# Patient Record
Sex: FEMALE | Race: WHITE | NOT HISPANIC OR LATINO | URBAN - METROPOLITAN AREA
[De-identification: names, ages, dates, MRNs, and addresses within clinical notes are randomized per-mention and may not be internally consistent; named-entity substitution may affect disease eponyms.]

---

## 2017-02-02 ENCOUNTER — EMERGENCY (EMERGENCY)
Facility: HOSPITAL | Age: 37
LOS: 1 days | Discharge: HOME | End: 2017-02-02
Attending: EMERGENCY MEDICINE | Admitting: EMERGENCY MEDICINE
Payer: COMMERCIAL

## 2017-02-02 VITALS
DIASTOLIC BLOOD PRESSURE: 77 MMHG | SYSTOLIC BLOOD PRESSURE: 115 MMHG | TEMPERATURE: 97 F | OXYGEN SATURATION: 100 % | HEART RATE: 891 BPM | RESPIRATION RATE: 18 BRPM

## 2017-02-02 DIAGNOSIS — R00.2 PALPITATIONS: ICD-10-CM

## 2017-02-02 DIAGNOSIS — R06.02 SHORTNESS OF BREATH: ICD-10-CM

## 2017-02-02 DIAGNOSIS — R61 GENERALIZED HYPERHIDROSIS: ICD-10-CM

## 2017-02-02 DIAGNOSIS — R51 HEADACHE: ICD-10-CM

## 2017-02-02 DIAGNOSIS — R55 SYNCOPE AND COLLAPSE: ICD-10-CM

## 2017-02-02 DIAGNOSIS — R10.13 EPIGASTRIC PAIN: ICD-10-CM

## 2017-02-02 PROCEDURE — 93010 ELECTROCARDIOGRAM REPORT: CPT

## 2017-02-02 PROCEDURE — 99285 EMERGENCY DEPT VISIT HI MDM: CPT | Mod: 25

## 2017-02-02 NOTE — ED ADULT NURSE NOTE - CHPI ED SYMPTOMS NEG
no cough/no back pain/no fever/no shortness of breath/no syncope/no chills/no diaphoresis/no nausea/no vomiting

## 2017-02-02 NOTE — ED ADULT NURSE NOTE - OBJECTIVE STATEMENT
pt c/o "constant epigastric pain that worsens after eating unresolved with zantac. around 2115 tonight had an episode of SOB after going up 1 flight of stairs and could not speak in full sentences." SOB has resolved and breathing fine at present

## 2017-02-03 VITALS
RESPIRATION RATE: 16 BRPM | HEART RATE: 75 BPM | OXYGEN SATURATION: 100 % | DIASTOLIC BLOOD PRESSURE: 75 MMHG | SYSTOLIC BLOOD PRESSURE: 105 MMHG

## 2017-02-03 LAB
ALBUMIN SERPL ELPH-MCNC: 4.3 G/DL — SIGNIFICANT CHANGE UP (ref 3.3–5)
ALP SERPL-CCNC: 33 U/L — LOW (ref 40–120)
ALT FLD-CCNC: 14 U/L RC — SIGNIFICANT CHANGE UP (ref 10–45)
ANION GAP SERPL CALC-SCNC: 10 MMOL/L — SIGNIFICANT CHANGE UP (ref 5–17)
AST SERPL-CCNC: 17 U/L — SIGNIFICANT CHANGE UP (ref 10–40)
BASOPHILS # BLD AUTO: 0 K/UL — SIGNIFICANT CHANGE UP (ref 0–0.2)
BASOPHILS NFR BLD AUTO: 0.7 % — SIGNIFICANT CHANGE UP (ref 0–2)
BILIRUB SERPL-MCNC: 0.3 MG/DL — SIGNIFICANT CHANGE UP (ref 0.2–1.2)
BUN SERPL-MCNC: 13 MG/DL — SIGNIFICANT CHANGE UP (ref 7–23)
CALCIUM SERPL-MCNC: 9 MG/DL — SIGNIFICANT CHANGE UP (ref 8.4–10.5)
CHLORIDE SERPL-SCNC: 106 MMOL/L — SIGNIFICANT CHANGE UP (ref 96–108)
CO2 SERPL-SCNC: 24 MMOL/L — SIGNIFICANT CHANGE UP (ref 22–31)
CREAT SERPL-MCNC: 0.69 MG/DL — SIGNIFICANT CHANGE UP (ref 0.5–1.3)
D DIMER BLD IA.RAPID-MCNC: <150 NG/ML DDU — SIGNIFICANT CHANGE UP
EOSINOPHIL # BLD AUTO: 0.1 K/UL — SIGNIFICANT CHANGE UP (ref 0–0.5)
EOSINOPHIL NFR BLD AUTO: 1 % — SIGNIFICANT CHANGE UP (ref 0–6)
GAS PNL BLDV: SIGNIFICANT CHANGE UP
GLUCOSE SERPL-MCNC: 116 MG/DL — HIGH (ref 70–99)
HCT VFR BLD CALC: 35.1 % — SIGNIFICANT CHANGE UP (ref 34.5–45)
HGB BLD-MCNC: 12.1 G/DL — SIGNIFICANT CHANGE UP (ref 11.5–15.5)
LIDOCAIN IGE QN: 34 U/L — SIGNIFICANT CHANGE UP (ref 7–60)
LYMPHOCYTES # BLD AUTO: 2.9 K/UL — SIGNIFICANT CHANGE UP (ref 1–3.3)
LYMPHOCYTES # BLD AUTO: 45.4 % — HIGH (ref 13–44)
MCHC RBC-ENTMCNC: 31.9 PG — SIGNIFICANT CHANGE UP (ref 27–34)
MCHC RBC-ENTMCNC: 34.5 GM/DL — SIGNIFICANT CHANGE UP (ref 32–36)
MCV RBC AUTO: 92.4 FL — SIGNIFICANT CHANGE UP (ref 80–100)
MONOCYTES # BLD AUTO: 0.8 K/UL — SIGNIFICANT CHANGE UP (ref 0–0.9)
MONOCYTES NFR BLD AUTO: 12.2 % — SIGNIFICANT CHANGE UP (ref 2–14)
NEUTROPHILS # BLD AUTO: 2.6 K/UL — SIGNIFICANT CHANGE UP (ref 1.8–7.4)
NEUTROPHILS NFR BLD AUTO: 40.7 % — LOW (ref 43–77)
PLATELET # BLD AUTO: 196 K/UL — SIGNIFICANT CHANGE UP (ref 150–400)
POTASSIUM SERPL-MCNC: 4.2 MMOL/L — SIGNIFICANT CHANGE UP (ref 3.5–5.3)
POTASSIUM SERPL-SCNC: 4.2 MMOL/L — SIGNIFICANT CHANGE UP (ref 3.5–5.3)
PROT SERPL-MCNC: 6.9 G/DL — SIGNIFICANT CHANGE UP (ref 6–8.3)
RBC # BLD: 3.8 M/UL — SIGNIFICANT CHANGE UP (ref 3.8–5.2)
RBC # FLD: 11.9 % — SIGNIFICANT CHANGE UP (ref 10.3–14.5)
SODIUM SERPL-SCNC: 140 MMOL/L — SIGNIFICANT CHANGE UP (ref 135–145)
TROPONIN T SERPL-MCNC: <0.01 NG/ML — SIGNIFICANT CHANGE UP (ref 0–0.06)
WBC # BLD: 6.5 K/UL — SIGNIFICANT CHANGE UP (ref 3.8–10.5)
WBC # FLD AUTO: 6.5 K/UL — SIGNIFICANT CHANGE UP (ref 3.8–10.5)

## 2017-02-03 PROCEDURE — 84132 ASSAY OF SERUM POTASSIUM: CPT

## 2017-02-03 PROCEDURE — 99284 EMERGENCY DEPT VISIT MOD MDM: CPT | Mod: 25

## 2017-02-03 PROCEDURE — 85027 COMPLETE CBC AUTOMATED: CPT

## 2017-02-03 PROCEDURE — 82330 ASSAY OF CALCIUM: CPT

## 2017-02-03 PROCEDURE — 96374 THER/PROPH/DIAG INJ IV PUSH: CPT

## 2017-02-03 PROCEDURE — 85379 FIBRIN DEGRADATION QUANT: CPT

## 2017-02-03 PROCEDURE — 83690 ASSAY OF LIPASE: CPT

## 2017-02-03 PROCEDURE — 84295 ASSAY OF SERUM SODIUM: CPT

## 2017-02-03 PROCEDURE — 82435 ASSAY OF BLOOD CHLORIDE: CPT

## 2017-02-03 PROCEDURE — 93005 ELECTROCARDIOGRAM TRACING: CPT

## 2017-02-03 PROCEDURE — 96375 TX/PRO/DX INJ NEW DRUG ADDON: CPT

## 2017-02-03 PROCEDURE — 85014 HEMATOCRIT: CPT

## 2017-02-03 PROCEDURE — 82947 ASSAY GLUCOSE BLOOD QUANT: CPT

## 2017-02-03 PROCEDURE — 80053 COMPREHEN METABOLIC PANEL: CPT

## 2017-02-03 PROCEDURE — 82803 BLOOD GASES ANY COMBINATION: CPT

## 2017-02-03 PROCEDURE — 83605 ASSAY OF LACTIC ACID: CPT

## 2017-02-03 PROCEDURE — 71020: CPT | Mod: 26

## 2017-02-03 PROCEDURE — 71046 X-RAY EXAM CHEST 2 VIEWS: CPT

## 2017-02-03 PROCEDURE — 84484 ASSAY OF TROPONIN QUANT: CPT

## 2017-02-03 RX ORDER — OMEPRAZOLE 10 MG/1
1 CAPSULE, DELAYED RELEASE ORAL
Qty: 30 | Refills: 0 | OUTPATIENT
Start: 2017-02-03 | End: 2017-03-05

## 2017-02-03 RX ORDER — FAMOTIDINE 10 MG/ML
20 INJECTION INTRAVENOUS ONCE
Qty: 0 | Refills: 0 | Status: COMPLETED | OUTPATIENT
Start: 2017-02-03 | End: 2017-02-03

## 2017-02-03 RX ORDER — LIDOCAINE 4 G/100G
20 CREAM TOPICAL ONCE
Qty: 0 | Refills: 0 | Status: COMPLETED | OUTPATIENT
Start: 2017-02-03 | End: 2017-02-03

## 2017-02-03 RX ORDER — ACETAMINOPHEN 500 MG
1000 TABLET ORAL ONCE
Qty: 0 | Refills: 0 | Status: COMPLETED | OUTPATIENT
Start: 2017-02-03 | End: 2017-02-03

## 2017-02-03 RX ORDER — SODIUM CHLORIDE 9 MG/ML
1000 INJECTION INTRAMUSCULAR; INTRAVENOUS; SUBCUTANEOUS ONCE
Qty: 0 | Refills: 0 | Status: COMPLETED | OUTPATIENT
Start: 2017-02-03 | End: 2017-02-03

## 2017-02-03 RX ORDER — SODIUM CHLORIDE 9 MG/ML
3 INJECTION INTRAMUSCULAR; INTRAVENOUS; SUBCUTANEOUS ONCE
Qty: 0 | Refills: 0 | Status: COMPLETED | OUTPATIENT
Start: 2017-02-03 | End: 2017-02-03

## 2017-02-03 RX ADMIN — FAMOTIDINE 20 MILLIGRAM(S): 10 INJECTION INTRAVENOUS at 01:06

## 2017-02-03 RX ADMIN — Medication 30 MILLILITER(S): at 01:06

## 2017-02-03 RX ADMIN — LIDOCAINE 20 MILLILITER(S): 4 CREAM TOPICAL at 01:06

## 2017-02-03 RX ADMIN — Medication 1000 MILLIGRAM(S): at 01:34

## 2017-02-03 RX ADMIN — Medication 400 MILLIGRAM(S): at 01:06

## 2017-02-03 RX ADMIN — SODIUM CHLORIDE 2000 MILLILITER(S): 9 INJECTION INTRAMUSCULAR; INTRAVENOUS; SUBCUTANEOUS at 01:06

## 2017-02-03 RX ADMIN — SODIUM CHLORIDE 3 MILLILITER(S): 9 INJECTION INTRAMUSCULAR; INTRAVENOUS; SUBCUTANEOUS at 00:31

## 2017-02-03 NOTE — ED PROVIDER NOTE - PHYSICAL EXAMINATION
Shanti: A & O x 3, NAD, HEENT WNL and no facial asymmetry; lungs CTAB, heart with reg rhythm without murmur; abdomen soft, extremities with no edema; skin with no rashes, neuro exam non focal with no motor or sensory deficits

## 2017-02-03 NOTE — ED PROVIDER NOTE - PLAN OF CARE
take omeprazole (nexium) 20mg daily  use zantac as needed as indicated on the label with respect to the warnings  Follow up with your primary care doctor within 48-72 hours.   You must return for new, worsening or concerning symptoms; specifically including those listed on the attached sheet.

## 2017-02-03 NOTE — ED PROVIDER NOTE - MEDICAL DECISION MAKING DETAILS
Dr. Russo (Attending Physician)  Pt. with episode of epigastric/chest pain, shortness of breath followed by near syncope.  ECG nsr without ST changes.  Lungs clear.  heart wnl.  Will check cxr, basic labs, and d-dimer to eval for PE since patient. is on contraceptives.  Likely reflux will give gi cocktail and dc home with PPI.

## 2017-02-03 NOTE — ED PROVIDER NOTE - CARE PLAN
Principal Discharge DX:	Epigastric pain  Instructions for follow-up, activity and diet:	take omeprazole (nexium) 20mg daily  use zantac as needed as indicated on the label with respect to the warnings  Follow up with your primary care doctor within 48-72 hours.   You must return for new, worsening or concerning symptoms; specifically including those listed on the attached sheet.

## 2017-02-03 NOTE — ED PROVIDER NOTE - ATTENDING CONTRIBUTION TO CARE
Dr. Russo (Attending Physician)  I performed a history and physical exam of the patient and discussed their management with the resident. I reviewed the resident's note and agree with the documented findings and plan of care. My medical decision making and observations are found above.

## 2017-02-03 NOTE — ED PROVIDER NOTE - OBJECTIVE STATEMENT
36 year old, history of epilepsy (last seizure years ago), compliant with keppra with epigastric pain for 1 day (refractory to zantac), pleuritic, worse with eating. new feeling of severe shortness of breath, diaphoresis, palpitations, weakness in hand, headache, near syncope while walking. BIBA.     currently feels tired, head heaviness, epigastric tenderness    ROS: subjective fever, no eye pain, throat scratching,  chest pain,  shortness of breath, epigstric abdominal pain,  no dysuria, no muscle pain, no rashes, currently no focal neurologic complaints, no known mental health issues     FH: cardiac disease in aunt, father  PMD: pankaj ontiveros 36 year old, history of epilepsy (last seizure years ago), compliant with keppra with epigastric pain for 1 day (refractory to zantac), pleuritic, worse with eating. new feeling of severe shortness of breath, diaphoresis, palpitations, weakness in hand, headache, near syncope while walking. BIBA.     ocp, smoker, no personal history of blood clot, no recent travel or surgery  currently feels tired, head heaviness, epigastric tenderness    ROS: subjective fever, no eye pain, throat scratching,  chest pain,  shortness of breath, epigstric abdominal pain,  no dysuria, no muscle pain, no rashes, currently no focal neurologic complaints, no known mental health issues     FH: cardiac disease in aunt, father  PMD: pankaj ontiveros 36 year old, history of epilepsy (last seizure years ago), compliant with keppra with epigastric pain for 1 day (refractory to zantac), pleuritic, worse with eating. new feeling of severe shortness of breath, diaphoresis, palpitations, weakness in hand, headache, near syncope while walking. BIBA.     ocp, smoker, no pulmonary embolismrsonal history of blood clot, no recent travel or surgery  currently feels tired, head heaviness, epigastric tenderness    ROS: subjective fever, no eye pain, throat scratching,  chest pain,  shortness of breath, epigstric abdominal pain,  no dysuria, no muscle pain, no rashes, currently no focal neurologic complaints, no known mental health issues     FH: cardiac disease in aunt, father  PMD: pankaj ontiveros

## 2017-07-14 ENCOUNTER — EMERGENCY (EMERGENCY)
Facility: HOSPITAL | Age: 37
LOS: 1 days | Discharge: HOME | End: 2017-07-14
Attending: EMERGENCY MEDICINE | Admitting: EMERGENCY MEDICINE
Payer: COMMERCIAL

## 2017-07-14 VITALS
TEMPERATURE: 98 F | HEART RATE: 70 BPM | DIASTOLIC BLOOD PRESSURE: 73 MMHG | OXYGEN SATURATION: 99 % | SYSTOLIC BLOOD PRESSURE: 115 MMHG | RESPIRATION RATE: 16 BRPM

## 2017-07-14 VITALS
RESPIRATION RATE: 20 BRPM | OXYGEN SATURATION: 100 % | HEART RATE: 80 BPM | DIASTOLIC BLOOD PRESSURE: 114 MMHG | TEMPERATURE: 99 F

## 2017-07-14 PROCEDURE — 99284 EMERGENCY DEPT VISIT MOD MDM: CPT | Mod: 25

## 2017-07-14 RX ORDER — KETOROLAC TROMETHAMINE 30 MG/ML
30 SYRINGE (ML) INJECTION ONCE
Qty: 0 | Refills: 0 | Status: DISCONTINUED | OUTPATIENT
Start: 2017-07-14 | End: 2017-07-15

## 2017-07-14 RX ORDER — LEVETIRACETAM 250 MG/1
500 TABLET, FILM COATED ORAL ONCE
Qty: 0 | Refills: 0 | Status: COMPLETED | OUTPATIENT
Start: 2017-07-14 | End: 2017-07-14

## 2017-07-14 RX ORDER — CYCLOBENZAPRINE HYDROCHLORIDE 10 MG/1
0 TABLET, FILM COATED ORAL
Qty: 0 | Refills: 0 | COMMUNITY

## 2017-07-14 RX ORDER — OXYCODONE HYDROCHLORIDE 5 MG/1
5 TABLET ORAL ONCE
Qty: 0 | Refills: 0 | Status: DISCONTINUED | OUTPATIENT
Start: 2017-07-14 | End: 2017-07-14

## 2017-07-14 RX ORDER — DIAZEPAM 5 MG
5 TABLET ORAL ONCE
Qty: 0 | Refills: 0 | Status: DISCONTINUED | OUTPATIENT
Start: 2017-07-14 | End: 2017-07-14

## 2017-07-14 RX ORDER — LEVETIRACETAM 250 MG/1
0 TABLET, FILM COATED ORAL
Qty: 0 | Refills: 0 | COMMUNITY

## 2017-07-14 NOTE — ED PROVIDER NOTE - ATTENDING CONTRIBUTION TO CARE
Attending MD Durán:  I personally have seen and examined this patient.  Resident note reviewed and agree on plan of care and except where noted.  See MDM for details.

## 2017-07-14 NOTE — ED ADULT NURSE NOTE - OBJECTIVE STATEMENT
36 y/o female A&Ox4 walked in with c/o lower left back pain. Pmhx seizures on Keppra daily. Pt states she has had increasing back pain since Saturday and today pain suddenly became sharp, constant, and "unbearable." VSS, denies chest pain/SOB. LS clear and equal bilat, ABD soft nontender, denies n/v/d and constipation. Skin intact. Peripheral pulses strong and normal baseline sensation present x4. Safety and comfort measures maintained. States LMP was 4 years ago.

## 2017-07-14 NOTE — ED PROVIDER NOTE - OBJECTIVE STATEMENT
37 year old, history of epilepsy (last seizure years ago), compliant with keppra, scoliosis presents with 1 week h/o LLBP.  Pain started insidiously, atraumatically, went to UC today where had UA, xray which were negative, d/w muscle strain and discharged on naproxen and cyclobenzaprine earlier today.  Came to this ED with persistent pain.  Pain is located L lateral lower back, w/w laying down and sitting, improved with standing and walking.  Denies fever, chills, weakness, loss of sensation, tingling, leg pain, saddle anesthesia, IVDA, urinary/bladder incontinence/retention, rash, dysuria, hematuria.

## 2017-07-14 NOTE — ED PROVIDER NOTE - CARE PLAN
Principal Discharge DX:	Back pain Principal Discharge DX:	Back pain  Instructions for follow-up, activity and diet:	Leeanna MD: Patient reassessed and reports resolution of back pain. Patient able to walk w/o difficulty and w/o weakness or sensory deficits. Ready for D/C with Percocet BID PO for 5 days and Valium 5 mg PO every day for 5 days.

## 2017-07-14 NOTE — ED PROVIDER NOTE - PHYSICAL EXAMINATION
***GEN - well appearing; NAD   ***HEAD - NC/AT  ***EYES/NOSE - PEERL, EOMI, mucous membranes moist, no discharge   ***THROAT: Oral cavity and pharynx normal. No inflammation, swelling, exudate, or lesions.    ***NECK: supple, non-tender no lymphadenopathy  ***PULMONARY - CTA b/l, symmetric breath sounds.   ***CARDIAC- s1s2, RRR, no murmur  ***ABDOMEN - +BS, ND, NT, soft, no guarding, no rebound, no organomegaly  ***BACK - no CVA tenderness, Normal  spine, no midline TTP, mild TTP L lateral lumbar back  ***EXTREMITIES - symmetric pulses, 2+ dp, capillary refill < 2 seconds, no clubbing, no cyanosis, no edema   ***SKIN - warm, dry, intact, no rash or bruising   ***NEUROLOGIC - a&o x3, CN 2-12 intact, sensation nl, motor 5/5 RUE/LUE/RLE/LLE gait nl, no saddle anesthesia

## 2017-07-14 NOTE — ED PROVIDER NOTE - PLAN OF CARE
Leeanna LEONARD: Patient reassessed and reports resolution of back pain. Patient able to walk w/o difficulty and w/o weakness or sensory deficits. Ready for D/C with Percocet BID PO for 5 days and Valium 5 mg PO every day for 5 days.

## 2017-07-15 LAB — HCG SERPL-ACNC: <2 MIU/ML — SIGNIFICANT CHANGE UP

## 2017-07-15 PROCEDURE — 99284 EMERGENCY DEPT VISIT MOD MDM: CPT

## 2017-07-15 PROCEDURE — 84702 CHORIONIC GONADOTROPIN TEST: CPT

## 2017-07-15 RX ORDER — OXYCODONE HYDROCHLORIDE 5 MG/1
1 TABLET ORAL
Qty: 10 | Refills: 0 | OUTPATIENT
Start: 2017-07-15 | End: 2017-07-20

## 2017-07-15 RX ORDER — OXYCODONE HYDROCHLORIDE 5 MG/1
1 TABLET ORAL
Qty: 9 | Refills: 0 | OUTPATIENT
Start: 2017-07-15 | End: 2017-07-18

## 2017-07-15 RX ORDER — DIAZEPAM 5 MG
1 TABLET ORAL
Qty: 5 | Refills: 0 | OUTPATIENT
Start: 2017-07-15 | End: 2017-07-20

## 2017-07-15 RX ORDER — IBUPROFEN 200 MG
600 TABLET ORAL ONCE
Qty: 0 | Refills: 0 | Status: COMPLETED | OUTPATIENT
Start: 2017-07-15 | End: 2017-07-15

## 2017-07-15 RX ADMIN — Medication 600 MILLIGRAM(S): at 01:01

## 2017-07-15 RX ADMIN — OXYCODONE HYDROCHLORIDE 5 MILLIGRAM(S): 5 TABLET ORAL at 01:01

## 2017-07-15 RX ADMIN — Medication 600 MILLIGRAM(S): at 00:31

## 2017-07-15 RX ADMIN — OXYCODONE HYDROCHLORIDE 5 MILLIGRAM(S): 5 TABLET ORAL at 00:31

## 2017-07-15 RX ADMIN — LEVETIRACETAM 500 MILLIGRAM(S): 250 TABLET, FILM COATED ORAL at 00:31

## 2017-07-15 RX ADMIN — Medication 5 MILLIGRAM(S): at 00:31

## 2017-07-15 NOTE — ED ADULT NURSE REASSESSMENT NOTE - NS ED NURSE REASSESS COMMENT FT1
Patient medicated for pain as per MD order, confirmed with Blas LEONARD. Will continue to monitor for pain relief.

## 2017-07-21 ENCOUNTER — APPOINTMENT (OUTPATIENT)
Dept: ORTHOPEDIC SURGERY | Facility: CLINIC | Age: 37
End: 2017-07-21

## 2017-07-21 VITALS
HEART RATE: 75 BPM | DIASTOLIC BLOOD PRESSURE: 69 MMHG | BODY MASS INDEX: 19.83 KG/M2 | SYSTOLIC BLOOD PRESSURE: 105 MMHG | HEIGHT: 65 IN | WEIGHT: 119 LBS

## 2017-07-21 DIAGNOSIS — M54.5 LOW BACK PAIN: ICD-10-CM

## 2017-07-21 DIAGNOSIS — M41.126 ADOLESCENT IDIOPATHIC SCOLIOSIS, LUMBAR REGION: ICD-10-CM

## 2017-07-21 DIAGNOSIS — Z86.69 PERSONAL HISTORY OF OTHER DISEASES OF THE NERVOUS SYSTEM AND SENSE ORGANS: ICD-10-CM

## 2017-07-21 DIAGNOSIS — Z78.9 OTHER SPECIFIED HEALTH STATUS: ICD-10-CM

## 2017-07-21 DIAGNOSIS — Z72.0 TOBACCO USE: ICD-10-CM

## 2017-07-21 DIAGNOSIS — M54.2 CERVICALGIA: ICD-10-CM

## 2017-07-21 RX ORDER — LEVETIRACETAM 1000 MG/1
TABLET, FILM COATED ORAL
Refills: 0 | Status: ACTIVE | COMMUNITY

## 2017-07-21 RX ORDER — NORETHINDRONE ACETATE AND ETHINYL ESTRADIOL, ETHINYL ESTRADIOL AND FERROUS FUMARATE 1MG-10(24)
KIT ORAL
Refills: 0 | Status: ACTIVE | COMMUNITY

## 2017-09-22 ENCOUNTER — APPOINTMENT (OUTPATIENT)
Dept: ULTRASOUND IMAGING | Facility: IMAGING CENTER | Age: 37
End: 2017-09-22
Payer: COMMERCIAL

## 2017-09-22 ENCOUNTER — APPOINTMENT (OUTPATIENT)
Dept: MAMMOGRAPHY | Facility: IMAGING CENTER | Age: 37
End: 2017-09-22
Payer: COMMERCIAL

## 2017-09-22 ENCOUNTER — OUTPATIENT (OUTPATIENT)
Dept: OUTPATIENT SERVICES | Facility: HOSPITAL | Age: 37
LOS: 1 days | End: 2017-09-22
Payer: COMMERCIAL

## 2017-09-22 DIAGNOSIS — Z00.8 ENCOUNTER FOR OTHER GENERAL EXAMINATION: ICD-10-CM

## 2017-09-22 PROCEDURE — 76641 ULTRASOUND BREAST COMPLETE: CPT | Mod: 26,50

## 2017-09-22 PROCEDURE — 77066 DX MAMMO INCL CAD BI: CPT

## 2017-09-22 PROCEDURE — G0204: CPT | Mod: 26

## 2017-09-22 PROCEDURE — G0279: CPT

## 2017-09-22 PROCEDURE — 76641 ULTRASOUND BREAST COMPLETE: CPT

## 2017-09-22 PROCEDURE — G0279: CPT | Mod: 26

## 2017-09-25 ENCOUNTER — RESULT REVIEW (OUTPATIENT)
Age: 37
End: 2017-09-25

## 2017-09-25 ENCOUNTER — APPOINTMENT (OUTPATIENT)
Dept: ULTRASOUND IMAGING | Facility: CLINIC | Age: 37
End: 2017-09-25
Payer: COMMERCIAL

## 2017-09-25 ENCOUNTER — OUTPATIENT (OUTPATIENT)
Dept: OUTPATIENT SERVICES | Facility: HOSPITAL | Age: 37
LOS: 1 days | End: 2017-09-25
Payer: COMMERCIAL

## 2017-09-25 DIAGNOSIS — Z00.8 ENCOUNTER FOR OTHER GENERAL EXAMINATION: ICD-10-CM

## 2017-09-25 PROCEDURE — G0206: CPT | Mod: 26,LT

## 2017-09-25 PROCEDURE — 77065 DX MAMMO INCL CAD UNI: CPT

## 2017-09-25 PROCEDURE — 19083 BX BREAST 1ST LESION US IMAG: CPT

## 2017-09-25 PROCEDURE — 19083 BX BREAST 1ST LESION US IMAG: CPT | Mod: LT

## 2017-09-27 DIAGNOSIS — R92.8 OTHER ABNORMAL AND INCONCLUSIVE FINDINGS ON DIAGNOSTIC IMAGING OF BREAST: ICD-10-CM

## 2017-09-27 DIAGNOSIS — N63 UNSPECIFIED LUMP IN BREAST: ICD-10-CM

## 2019-01-07 ENCOUNTER — TRANSCRIPTION ENCOUNTER (OUTPATIENT)
Age: 39
End: 2019-01-07

## 2020-11-10 ENCOUNTER — EMERGENCY (EMERGENCY)
Facility: HOSPITAL | Age: 40
LOS: 1 days | Discharge: HOME | End: 2020-11-10
Attending: EMERGENCY MEDICINE
Payer: COMMERCIAL

## 2020-11-10 VITALS
SYSTOLIC BLOOD PRESSURE: 109 MMHG | WEIGHT: 119.93 LBS | HEIGHT: 65 IN | TEMPERATURE: 98 F | OXYGEN SATURATION: 100 % | DIASTOLIC BLOOD PRESSURE: 76 MMHG | HEART RATE: 85 BPM | RESPIRATION RATE: 18 BRPM

## 2020-11-10 PROCEDURE — 99285 EMERGENCY DEPT VISIT HI MDM: CPT

## 2020-11-10 NOTE — ED ADULT TRIAGE NOTE - CHIEF COMPLAINT QUOTE
abdominal pain and distention today. Patient also reports difficulty urinating, denies burning or pressure

## 2020-11-11 VITALS
TEMPERATURE: 98 F | DIASTOLIC BLOOD PRESSURE: 60 MMHG | RESPIRATION RATE: 18 BRPM | SYSTOLIC BLOOD PRESSURE: 96 MMHG | HEART RATE: 71 BPM | OXYGEN SATURATION: 99 %

## 2020-11-11 PROBLEM — Z13.828 ENCOUNTER FOR SCREENING FOR OTHER MUSCULOSKELETAL DISORDER: Chronic | Status: ACTIVE | Noted: 2017-07-14

## 2020-11-11 LAB
ALBUMIN SERPL ELPH-MCNC: 4.4 G/DL — SIGNIFICANT CHANGE UP (ref 3.3–5)
ALP SERPL-CCNC: 40 U/L — SIGNIFICANT CHANGE UP (ref 40–120)
ALT FLD-CCNC: 12 U/L — SIGNIFICANT CHANGE UP (ref 10–45)
ANION GAP SERPL CALC-SCNC: 10 MMOL/L — SIGNIFICANT CHANGE UP (ref 5–17)
APPEARANCE UR: CLEAR — SIGNIFICANT CHANGE UP
AST SERPL-CCNC: 17 U/L — SIGNIFICANT CHANGE UP (ref 10–40)
BASOPHILS # BLD AUTO: 0.04 K/UL — SIGNIFICANT CHANGE UP (ref 0–0.2)
BASOPHILS NFR BLD AUTO: 0.4 % — SIGNIFICANT CHANGE UP (ref 0–2)
BILIRUB SERPL-MCNC: 0.3 MG/DL — SIGNIFICANT CHANGE UP (ref 0.2–1.2)
BILIRUB UR-MCNC: NEGATIVE — SIGNIFICANT CHANGE UP
BUN SERPL-MCNC: 16 MG/DL — SIGNIFICANT CHANGE UP (ref 7–23)
CALCIUM SERPL-MCNC: 8.9 MG/DL — SIGNIFICANT CHANGE UP (ref 8.4–10.5)
CHLORIDE SERPL-SCNC: 106 MMOL/L — SIGNIFICANT CHANGE UP (ref 96–108)
CO2 SERPL-SCNC: 24 MMOL/L — SIGNIFICANT CHANGE UP (ref 22–31)
COLOR SPEC: SIGNIFICANT CHANGE UP
CREAT SERPL-MCNC: 0.64 MG/DL — SIGNIFICANT CHANGE UP (ref 0.5–1.3)
DIFF PNL FLD: NEGATIVE — SIGNIFICANT CHANGE UP
EOSINOPHIL # BLD AUTO: 0.12 K/UL — SIGNIFICANT CHANGE UP (ref 0–0.5)
EOSINOPHIL NFR BLD AUTO: 1.2 % — SIGNIFICANT CHANGE UP (ref 0–6)
GLUCOSE SERPL-MCNC: 94 MG/DL — SIGNIFICANT CHANGE UP (ref 70–99)
GLUCOSE UR QL: NEGATIVE — SIGNIFICANT CHANGE UP
HCG UR QL: NEGATIVE — SIGNIFICANT CHANGE UP
HCT VFR BLD CALC: 36.1 % — SIGNIFICANT CHANGE UP (ref 34.5–45)
HGB BLD-MCNC: 12 G/DL — SIGNIFICANT CHANGE UP (ref 11.5–15.5)
IMM GRANULOCYTES NFR BLD AUTO: 0.3 % — SIGNIFICANT CHANGE UP (ref 0–1.5)
KETONES UR-MCNC: NEGATIVE — SIGNIFICANT CHANGE UP
LEUKOCYTE ESTERASE UR-ACNC: NEGATIVE — SIGNIFICANT CHANGE UP
LIDOCAIN IGE QN: 34 U/L — SIGNIFICANT CHANGE UP (ref 7–60)
LYMPHOCYTES # BLD AUTO: 3.03 K/UL — SIGNIFICANT CHANGE UP (ref 1–3.3)
LYMPHOCYTES # BLD AUTO: 31.5 % — SIGNIFICANT CHANGE UP (ref 13–44)
MCHC RBC-ENTMCNC: 31.6 PG — SIGNIFICANT CHANGE UP (ref 27–34)
MCHC RBC-ENTMCNC: 33.2 GM/DL — SIGNIFICANT CHANGE UP (ref 32–36)
MCV RBC AUTO: 95 FL — SIGNIFICANT CHANGE UP (ref 80–100)
MONOCYTES # BLD AUTO: 0.64 K/UL — SIGNIFICANT CHANGE UP (ref 0–0.9)
MONOCYTES NFR BLD AUTO: 6.7 % — SIGNIFICANT CHANGE UP (ref 2–14)
NEUTROPHILS # BLD AUTO: 5.75 K/UL — SIGNIFICANT CHANGE UP (ref 1.8–7.4)
NEUTROPHILS NFR BLD AUTO: 59.9 % — SIGNIFICANT CHANGE UP (ref 43–77)
NITRITE UR-MCNC: NEGATIVE — SIGNIFICANT CHANGE UP
NRBC # BLD: 0 /100 WBCS — SIGNIFICANT CHANGE UP (ref 0–0)
PH UR: 6.5 — SIGNIFICANT CHANGE UP (ref 5–8)
PLATELET # BLD AUTO: 209 K/UL — SIGNIFICANT CHANGE UP (ref 150–400)
POTASSIUM SERPL-MCNC: 3.8 MMOL/L — SIGNIFICANT CHANGE UP (ref 3.5–5.3)
POTASSIUM SERPL-SCNC: 3.8 MMOL/L — SIGNIFICANT CHANGE UP (ref 3.5–5.3)
PROT SERPL-MCNC: 6.6 G/DL — SIGNIFICANT CHANGE UP (ref 6–8.3)
PROT UR-MCNC: ABNORMAL
RBC # BLD: 3.8 M/UL — SIGNIFICANT CHANGE UP (ref 3.8–5.2)
RBC # FLD: 12.3 % — SIGNIFICANT CHANGE UP (ref 10.3–14.5)
SODIUM SERPL-SCNC: 140 MMOL/L — SIGNIFICANT CHANGE UP (ref 135–145)
SP GR SPEC: 1.02 — SIGNIFICANT CHANGE UP (ref 1.01–1.02)
UROBILINOGEN FLD QL: NEGATIVE — SIGNIFICANT CHANGE UP
WBC # BLD: 9.61 K/UL — SIGNIFICANT CHANGE UP (ref 3.8–10.5)
WBC # FLD AUTO: 9.61 K/UL — SIGNIFICANT CHANGE UP (ref 3.8–10.5)

## 2020-11-11 PROCEDURE — 74177 CT ABD & PELVIS W/CONTRAST: CPT | Mod: 26

## 2020-11-11 PROCEDURE — 85025 COMPLETE CBC W/AUTO DIFF WBC: CPT

## 2020-11-11 PROCEDURE — 93975 VASCULAR STUDY: CPT

## 2020-11-11 PROCEDURE — 93975 VASCULAR STUDY: CPT | Mod: 26

## 2020-11-11 PROCEDURE — 76830 TRANSVAGINAL US NON-OB: CPT

## 2020-11-11 PROCEDURE — 81025 URINE PREGNANCY TEST: CPT

## 2020-11-11 PROCEDURE — 83690 ASSAY OF LIPASE: CPT

## 2020-11-11 PROCEDURE — 74177 CT ABD & PELVIS W/CONTRAST: CPT

## 2020-11-11 PROCEDURE — 81001 URINALYSIS AUTO W/SCOPE: CPT

## 2020-11-11 PROCEDURE — 76830 TRANSVAGINAL US NON-OB: CPT | Mod: 26

## 2020-11-11 PROCEDURE — 80053 COMPREHEN METABOLIC PANEL: CPT

## 2020-11-11 PROCEDURE — 87086 URINE CULTURE/COLONY COUNT: CPT

## 2020-11-11 PROCEDURE — 99284 EMERGENCY DEPT VISIT MOD MDM: CPT | Mod: 25

## 2020-11-11 RX ORDER — KETOROLAC TROMETHAMINE 30 MG/ML
15 SYRINGE (ML) INJECTION ONCE
Refills: 0 | Status: DISCONTINUED | OUTPATIENT
Start: 2020-11-11 | End: 2020-11-11

## 2020-11-11 NOTE — ED PROVIDER NOTE - PATIENT PORTAL LINK FT
Primary osteoarthritis of right knee  10/26/2018    Active  Eric Torres You can access the FollowMyHealth Patient Portal offered by [facility] by registering at the following website: [email or web address]. By joining [employer]’s FollowMyHealth portal, you will also be able to view your health information using other applications (apps) compatible with our system.

## 2020-11-11 NOTE — ED PROVIDER NOTE - SHIFT CHANGE DETAILS
Josh Glaser MD FACEP note of transfer at the usual time of sign out: Receiving team will follow up on labs, analgesia, any clinical imaging, reassess and disposition as clinically indicated.  Details of patient and plan conveyed to receiving physician and conveyed back for understanding.  There were no questions at this time about the patient's status, disposition, and plan. Patient's care to be taken over by receiving physician at this time, all decisions regarding the progression of care will be made at their discretion.

## 2020-11-11 NOTE — ED ADULT NURSE REASSESSMENT NOTE - NS ED NURSE REASSESS COMMENT FT1
Recvd pt asleep but responsive to all stimuli.  no sob or respiratory distress noted at this time.  vss.  safety precautions in place.  pt for transport to ultrasound and md reeval.  will continue to monitor.

## 2020-11-11 NOTE — ED PROVIDER NOTE - NSFOLLOWUPINSTRUCTIONS_ED_ALL_ED_FT
1. Please follow up with your primary care doctor to discuss ED visit. Please bring a copy of results to follow up   2. For persistent pain we recommend close follow up with a Gynecologist and a Gastroenterologist. If you do not have these types of doctors, please see above for information to make a follow up appointment   3. Rest and stay hydrated. Recommend over the counter Motrin and/or Tylenol for persistent pain  4. Return to ED for change of symptoms including increased pain, nausea, vomiting, fevers and all other concerns

## 2020-11-11 NOTE — ED PROVIDER NOTE - NS ED ROS FT
GENERAL: No fever or chills, EYES: no change in vision, HEENT: no trouble speaking, CARDIAC: no chest pain, palpitation PULMONARY: no cough or SOB, GI: + abdominal pain, no nausea, no vomiting, no diarrhea or + constipation, : No changes in urination, SKIN: no rashes, NEURO: no headache,  MSK: No muscle pain ~Jose Khan MD

## 2020-11-11 NOTE — ED PROVIDER NOTE - NSFOLLOWUPCLINICS_GEN_ALL_ED_FT
[facility] Gynecology and Obstetrics  Gynceology/OB  [address] [place] [address]  Phone: [phone]  Fax:     Gastroenterology at [facility]  Gastroenterology  [address] [place] [id number]  Phone: [phone]  Fax:   Follow Up Time:

## 2020-11-11 NOTE — ED PROVIDER NOTE - CLINICAL SUMMARY MEDICAL DECISION MAKING FREE TEXT BOX
Jose Khan MD: 39 yo F PMH p/w suprapubic abd pain x 1 day. UTI vs Constipation. will get basic labs, lipase, ua urine culture and reeval Jose Khan MD: 39 yo F PMH p/w suprapubic abd pain x 1 day. UTI vs Constipation. will get basic labs, lipase, ua urine culture and reeval. Patient with pain slightly right of midline and without gross evidence of uti, will obtain ultrasound for evaluation of ovarian pathology, historical symptoms/features and clinical exam not consistent with torsion or appendicitis at this time as without severe pain consistent with torsion and patient without fever/chills/leukocytosis and is without pain to deep palpation of right lower quadrant laterally.  will reassess after ultrasound and disposition as clinically indicated.

## 2020-11-11 NOTE — ED ADULT NURSE NOTE - OBJECTIVE STATEMENT
39 y/o female presents to ED c/o suprapubic abd pain x1 day. PMH of epilepsy. Pt reports pain is non-radiating, 41 y/o female presents to ED c/o suprapubic abd pain x1 day. PMH of epilepsy. Pt reports pain is non-radiating, aggravated w/ walking, relieved by laying. Pt reports she has had abd issues such as constipation, bloating, and gas pains for months, has tried diet changes, prisolec, other medications. Last BM was today. Urinary urgency x3 mo as well. Pt had severe episode of abd pain piror to arrival, which brought her to ED. Denies fever, chills, sweats, n/v/d, back pain, hematuria, cough, chest pain, SOB. A&Ox4, breath sounds clear bilaterally, abd soft nontender, skin warm dry and intact, ambulatory independently w/ steady gait.

## 2020-11-11 NOTE — ED PROVIDER NOTE - OBJECTIVE STATEMENT
Jose Khan MD: 39 yo F PMH p/w suprapubic abd pain x 1 day. Pt's reports abd pain intermittent non radiating. Aggravated by walking and relief with laying. Report urinary urgency x 3 month. Denies any fever, weight loss, nausea, vomiting, constipation, blood is stool or urine, melena, dysuria, abdominal surgeries, obstipation. Pt states report abd bloating after eating x 3 months Jose Khan MD: 39 yo F PMH p/w suprapubic abd pain x 1 day. Pt's reports abd pain intermittent non radiating. Aggravated by walking and relief with laying. Report urinary urgency x 3 month. Denies any fever, weight loss, nausea, vomiting, constipation, blood is stool or urine, melena, dysuria, abdominal surgeries, obstipation. Pt states report abd bloating after eating x 3 months. No f/c. Patient denies urinary urgency/frequency. + bouts of loose stools per patient x 1-2 days

## 2020-11-11 NOTE — ED PROVIDER NOTE - PROGRESS NOTE DETAILS
Received sign out from / Erin. Pt pending TVUS to further evaluate suprapubic pain   Domenica Crawford PA-C Pt US unremarkable. Still with RLQ and suprapubic ttp. Will treat with Toradol and order CT to further eval. pt agreeable to scan   Domenica Crawford PA-C Pt Ct unremarkable. Discussed w. pt. Will advise close GYN and GI follow up for persistent pain  Domenica Crawford PA-C

## 2020-11-11 NOTE — ED PROVIDER NOTE - PHYSICAL EXAMINATION
Gen: AAOx3, non-toxic  Head: NCAT  HEENT: EOMI, PERRLA, oral mucosa moist, normal conjunctiva  Lung: CTAB, no respiratory distress, no wheezes/rhonchi/rales B/L, speaking in full sentences  CV: RRR, no murmurs, rubs or gallops  Abd: soft, NTND, no guarding, no CVA tenderness, no rebound tenderness  MSK: no visible deformities, full range of motion of all 4 exts  Neuro: No focal sensory or motor deficits  Skin: Warm, well perfused, no rash  Psych: normal affect.   ~Jose Khan MD Gen: AAOx3, non-toxic  Head: NCAT  HEENT: EOMI, PERRLA, oral mucosa moist, normal conjunctiva  Lung: CTAB, no respiratory distress, no wheezes/rhonchi/rales B/L, speaking in full sentences  CV: RRR, no murmurs, rubs or gallops  Abd: soft, NTND, no guarding, no CVA tenderness, no rebound tenderness, mild tenderness to palpation to midline over bladder and just right of midline ~5cm, no right lower quadrant tenderness to deep palpation over region of appendix  MSK: no visible deformities, full range of motion of all 4 exts  Neuro: No focal sensory or motor deficits  Skin: Warm, well perfused, no rash  Psych: normal affect.   ~Jose Khan MD

## 2020-11-12 LAB
CULTURE RESULTS: SIGNIFICANT CHANGE UP
SPECIMEN SOURCE: SIGNIFICANT CHANGE UP

## 2021-02-16 RX ORDER — CHLORDIAZEPOXIDE HYDROCHLORIDE AND CLIDINIUM BROMIDE 5; 2.5 MG/1; MG/1
5-2.5 CAPSULE ORAL
Refills: 0 | Status: ACTIVE | COMMUNITY

## 2021-02-25 ENCOUNTER — APPOINTMENT (OUTPATIENT)
Dept: GASTROENTEROLOGY | Facility: CLINIC | Age: 41
End: 2021-02-25

## 2021-04-02 ENCOUNTER — TRANSCRIPTION ENCOUNTER (OUTPATIENT)
Age: 41
End: 2021-04-02

## 2021-05-10 ENCOUNTER — NON-APPOINTMENT (OUTPATIENT)
Age: 41
End: 2021-05-10

## 2021-05-10 ENCOUNTER — APPOINTMENT (OUTPATIENT)
Dept: INTERNAL MEDICINE | Facility: CLINIC | Age: 41
End: 2021-05-10
Payer: COMMERCIAL

## 2021-05-10 VITALS
RESPIRATION RATE: 14 BRPM | SYSTOLIC BLOOD PRESSURE: 91 MMHG | HEIGHT: 65 IN | DIASTOLIC BLOOD PRESSURE: 53 MMHG | TEMPERATURE: 98.7 F | HEART RATE: 71 BPM | WEIGHT: 124 LBS | OXYGEN SATURATION: 100 % | BODY MASS INDEX: 20.66 KG/M2

## 2021-05-10 DIAGNOSIS — Z82.49 FAMILY HISTORY OF ISCHEMIC HEART DISEASE AND OTHER DISEASES OF THE CIRCULATORY SYSTEM: ICD-10-CM

## 2021-05-10 DIAGNOSIS — K29.70 GASTRITIS, UNSPECIFIED, W/OUT BLEEDING: ICD-10-CM

## 2021-05-10 DIAGNOSIS — Z82.0 FAMILY HISTORY OF EPILEPSY AND OTHER DISEASES OF THE NERVOUS SYSTEM: ICD-10-CM

## 2021-05-10 DIAGNOSIS — Z00.00 ENCOUNTER FOR GENERAL ADULT MEDICAL EXAMINATION W/OUT ABNORMAL FINDINGS: ICD-10-CM

## 2021-05-10 DIAGNOSIS — K58.9 IRRITABLE BOWEL SYNDROME W/OUT DIARRHEA: ICD-10-CM

## 2021-05-10 DIAGNOSIS — K21.9 GASTRO-ESOPHAGEAL REFLUX DISEASE W/OUT ESOPHAGITIS: ICD-10-CM

## 2021-05-10 PROCEDURE — 93000 ELECTROCARDIOGRAM COMPLETE: CPT

## 2021-05-10 PROCEDURE — 99072 ADDL SUPL MATRL&STAF TM PHE: CPT

## 2021-05-10 PROCEDURE — 99203 OFFICE O/P NEW LOW 30 MIN: CPT | Mod: 25

## 2021-05-11 PROBLEM — Z82.49 FAMILY HISTORY OF CORONARY ARTERY DISEASE: Status: ACTIVE | Noted: 2021-05-10

## 2021-05-11 PROBLEM — Z82.0 FAMILY HISTORY OF EPILEPSY: Status: ACTIVE | Noted: 2021-05-10

## 2021-05-11 RX ORDER — OMEPRAZOLE 40 MG/1
40 CAPSULE, DELAYED RELEASE ORAL
Qty: 30 | Refills: 1 | Status: ACTIVE | COMMUNITY
Start: 1900-01-01 | End: 1900-01-01

## 2021-05-11 RX ORDER — PANTOPRAZOLE SODIUM 40 MG/10ML
40 INJECTION, POWDER, FOR SOLUTION INTRAVENOUS
Qty: 30 | Refills: 1 | Status: COMPLETED | COMMUNITY
Start: 2021-05-10 | End: 2021-05-11

## 2021-05-11 NOTE — HISTORY OF PRESENT ILLNESS
[FreeTextEntry8] : MYRON CHOW is a 40 year old female with a PMHx of seizure disorder, IBS, and GERD who presents today for evaluation.\par \par She relates abdominal pain, decreased appetite and bloating of the abdomen after eating since November 2020.\par \par Pt states that symptoms started since last March, when the COVID-19 pandemic began. She was seen by GI and EGD was done which was unremarkable.\par \par Pt was also seen by GYN and and the IUD was D/C and patient when back to taking birth control pills.\par \par Pt last PAP smear was 01/2021.\par \par PT last mammography was 01/2019.\par \par PT had EGD 12/2020.

## 2021-05-11 NOTE — END OF VISIT
[FreeTextEntry3] : I, Rox Simmons, personally transcribed these services in the presence of Dr. Ayesha Clark MD. I, Dr. Ayesha Clark MD, personally performed the services described in this documentation as scribed by Rox Simmons in my presence and it is both accurate and complete.\par

## 2021-05-11 NOTE — PLAN
[FreeTextEntry1] : Blood tests and urine sent to the lab\par \par Screening for COVID-19 antibodies \par \par Pantoprazole 40 mg po qd

## 2021-05-11 NOTE — PHYSICAL EXAM
[No Acute Distress] : no acute distress [Well Nourished] : well nourished [Well Developed] : well developed [Well-Appearing] : well-appearing [Normal Sclera/Conjunctiva] : normal sclera/conjunctiva [PERRL] : pupils equal round and reactive to light [EOMI] : extraocular movements intact [Normal Outer Ear/Nose] : the outer ears and nose were normal in appearance [Normal Oropharynx] : the oropharynx was normal [No JVD] : no jugular venous distention [No Lymphadenopathy] : no lymphadenopathy [Supple] : supple [Thyroid Normal, No Nodules] : the thyroid was normal and there were no nodules present [No Respiratory Distress] : no respiratory distress  [No Accessory Muscle Use] : no accessory muscle use [Clear to Auscultation] : lungs were clear to auscultation bilaterally [Normal Rate] : normal rate  [Regular Rhythm] : with a regular rhythm [Normal S1, S2] : normal S1 and S2 [No Murmur] : no murmur heard [No Carotid Bruits] : no carotid bruits [No Abdominal Bruit] : a ~M bruit was not heard ~T in the abdomen [No Varicosities] : no varicosities [Pedal Pulses Present] : the pedal pulses are present [No Edema] : there was no peripheral edema [No Palpable Aorta] : no palpable aorta [No Extremity Clubbing/Cyanosis] : no extremity clubbing/cyanosis [Normal Appearance] : normal in appearance [No Nipple Discharge] : no nipple discharge [No Axillary Lymphadenopathy] : no axillary lymphadenopathy [Soft] : abdomen soft [Non Tender] : non-tender [Non-distended] : non-distended [No Masses] : no abdominal mass palpated [No HSM] : no HSM [Normal Bowel Sounds] : normal bowel sounds [Normal Posterior Cervical Nodes] : no posterior cervical lymphadenopathy [Normal Anterior Cervical Nodes] : no anterior cervical lymphadenopathy [No CVA Tenderness] : no CVA  tenderness [No Spinal Tenderness] : no spinal tenderness [No Joint Swelling] : no joint swelling [Grossly Normal Strength/Tone] : grossly normal strength/tone [No Rash] : no rash [Coordination Grossly Intact] : coordination grossly intact [No Focal Deficits] : no focal deficits [Normal Gait] : normal gait [Deep Tendon Reflexes (DTR)] : deep tendon reflexes were 2+ and symmetric [Normal Affect] : the affect was normal [Normal Insight/Judgement] : insight and judgment were intact [de-identified] : bilateral breast reduction [FreeTextEntry1] : Deferred

## 2021-05-11 NOTE — HEALTH RISK ASSESSMENT
[] : Yes [Yes] : Yes [2 - 4 times a month (2 pts)] : 2-4 times a month (2 points) [1 or 2 (0 pts)] : 1 or 2 (0 points) [Never (0 pts)] : Never (0 points) [No] : In the past 12 months have you used drugs other than those required for medical reasons? No [No falls in past year] : Patient reported no falls in the past year [0] : 2) Feeling down, depressed, or hopeless: Not at all (0) [de-identified] : social cigarette use, 4-5 cigarettes per day [de-identified] : socially [Audit-CScore] : 2 [de-identified] : active [de-identified] : regular [[id number]] : 0

## 2021-05-17 LAB
25(OH)D3 SERPL-MCNC: 50.6 NG/ML
ALBUMIN SERPL ELPH-MCNC: 5 G/DL
ALP BLD-CCNC: 43 U/L
ALT SERPL-CCNC: 13 U/L
ANION GAP SERPL CALC-SCNC: 8 MMOL/L
APPEARANCE: CLEAR
AST SERPL-CCNC: 15 U/L
BACTERIA: NEGATIVE
BASOPHILS # BLD AUTO: 0.04 K/UL
BASOPHILS NFR BLD AUTO: 0.5 %
BILIRUB SERPL-MCNC: 0.4 MG/DL
BILIRUBIN URINE: NEGATIVE
BLOOD URINE: NEGATIVE
BUN SERPL-MCNC: 14 MG/DL
CALCIUM SERPL-MCNC: 9.9 MG/DL
CHLORIDE SERPL-SCNC: 106 MMOL/L
CHOLEST SERPL-MCNC: 214 MG/DL
CO2 SERPL-SCNC: 23 MMOL/L
COLOR: NORMAL
CREAT SERPL-MCNC: 0.74 MG/DL
EOSINOPHIL # BLD AUTO: 0.06 K/UL
EOSINOPHIL NFR BLD AUTO: 0.8 %
GLUCOSE QUALITATIVE U: NEGATIVE
GLUCOSE SERPL-MCNC: 89 MG/DL
HCT VFR BLD CALC: 39.4 %
HDLC SERPL-MCNC: 67 MG/DL
HGB BLD-MCNC: 12.8 G/DL
HYALINE CASTS: 2 /LPF
IMM GRANULOCYTES NFR BLD AUTO: 0.3 %
KETONES URINE: NEGATIVE
LDLC SERPL CALC-MCNC: 126 MG/DL
LEUKOCYTE ESTERASE URINE: NEGATIVE
LYMPHOCYTES # BLD AUTO: 2.64 K/UL
LYMPHOCYTES NFR BLD AUTO: 35.4 %
MAN DIFF?: NORMAL
MCHC RBC-ENTMCNC: 30.9 PG
MCHC RBC-ENTMCNC: 32.5 GM/DL
MCV RBC AUTO: 95.2 FL
MICROSCOPIC-UA: NORMAL
MONOCYTES # BLD AUTO: 0.6 K/UL
MONOCYTES NFR BLD AUTO: 8.1 %
NEUTROPHILS # BLD AUTO: 4.09 K/UL
NEUTROPHILS NFR BLD AUTO: 54.9 %
NITRITE URINE: NEGATIVE
NONHDLC SERPL-MCNC: 147 MG/DL
PH URINE: 5.5
PLATELET # BLD AUTO: 273 K/UL
POTASSIUM SERPL-SCNC: 4.4 MMOL/L
PROT SERPL-MCNC: 7.6 G/DL
PROTEIN URINE: NEGATIVE
RBC # BLD: 4.14 M/UL
RBC # FLD: 12.7 %
RED BLOOD CELLS URINE: 1 /HPF
SODIUM SERPL-SCNC: 137 MMOL/L
SPECIFIC GRAVITY URINE: 1.02
SQUAMOUS EPITHELIAL CELLS: 1 /HPF
T3 SERPL-MCNC: 103 NG/DL
T4 FREE SERPL-MCNC: 1.4 NG/DL
T4 SERPL-MCNC: 8.5 UG/DL
TRIGL SERPL-MCNC: 108 MG/DL
TSH SERPL-ACNC: 1.3 UIU/ML
UROBILINOGEN URINE: NORMAL
WBC # FLD AUTO: 7.45 K/UL
WHITE BLOOD CELLS URINE: 0 /HPF

## 2023-06-05 VITALS
SYSTOLIC BLOOD PRESSURE: 115 MMHG | WEIGHT: 124 LBS | DIASTOLIC BLOOD PRESSURE: 80 MMHG | HEIGHT: 65 IN | HEART RATE: 72 BPM | BODY MASS INDEX: 20.66 KG/M2

## 2024-02-13 ENCOUNTER — NON-APPOINTMENT (OUTPATIENT)
Age: 44
End: 2024-02-13

## 2024-02-13 DIAGNOSIS — B00.9 HERPESVIRAL INFECTION, UNSPECIFIED: ICD-10-CM

## 2024-02-13 DIAGNOSIS — Z80.3 FAMILY HISTORY OF MALIGNANT NEOPLASM OF BREAST: ICD-10-CM

## 2024-02-13 DIAGNOSIS — Z80.0 FAMILY HISTORY OF MALIGNANT NEOPLASM OF DIGESTIVE ORGANS: ICD-10-CM

## 2024-02-13 RX ORDER — LEVETIRACETAM 500 MG/1
500 TABLET, FILM COATED, EXTENDED RELEASE ORAL DAILY
Refills: 0 | Status: ACTIVE | COMMUNITY

## 2024-06-07 NOTE — ED ADULT NURSE NOTE - NURSING MUSC EXTREMITY LIMITED ROM
Anticoagulation Clinic Progress Note    Anticoagulation Summary  As of 2024      INR goal:  2.0-3.0   TTR:  57.4% (3.3 y)   INR used for dosin.7 (2024)   Warfarin maintenance plan:  1 mg every Wed; 2 mg all other days   Weekly warfarin total:  13 mg   Plan last modified:  Ela Rodriguez, PharmD (5/3/2024)   Next INR check:  2024   Priority:  High   Target end date:      Indications    PAF (paroxysmal atrial fibrillation) [I48.0]                 Anticoagulation Episode Summary       INR check location:      Preferred lab:      Send INR reminders to:   [facility]    Comments:  Previously apixaban (cost)          Anticoagulation Care Providers       Provider Role Specialty Phone number    Francesco Riojas MD Referring Cardiology [phone]            Clinic Interview:  Patient Findings     Positives:  Missed doses    Negatives:  Signs/symptoms of thrombosis, Signs/symptoms of bleeding,   Laboratory test error suspected, Change in health, Change in alcohol use,   Change in activity, Upcoming invasive procedure, Emergency department   visit, Upcoming dental procedure, Extra doses, Change in medications,   Change in diet/appetite, Hospital admission, Bruising, Other complaints    Comments:  Patient reports that she missed her dose on Wed ().      Clinical Outcomes     Negatives:  Major bleeding event, Thromboembolic event,   Anticoagulation-related hospital admission, Anticoagulation-related ED   visit, Anticoagulation-related fatality    Comments:  Patient reports that she missed her dose on Wed ().        INR History:      3/8/2024     8:45 AM 3/28/2024     9:30 AM 2024     9:30 AM 2024     9:00 AM 5/3/2024     9:00 AM 2024     9:00 AM 2024     8:45 AM   Anticoagulation Monitoring   INR 1.7 1.7 4.5 3.0 1.9 3.0 1.7   INR Date 3/8/2024 3/28/2024 2024 2024 5/3/2024 2024 2024   INR Goal 2.0-3.0 2.0-3.0 2.0-3.0 2.0-3.0 2.0-3.0 2.0-3.0  2.0-3.0   Trend Up Same Same Same Up Same Same   Last Week Total 11 mg 12 mg 12 mg 10 mg 12 mg 13 mg 12 mg   Next Week Total 12 mg 14 mg 10 mg 12 mg 13 mg 13 mg 15 mg   Sun 2 mg 2 mg 2 mg 2 mg 2 mg 2 mg 2 mg   Mon 1 mg 1 mg 1 mg 1 mg 2 mg (5/6, 5/13) 2 mg 2 mg   Tue 2 mg 2 mg 2 mg 2 mg 2 mg 2 mg 2 mg   Wed 1 mg 1 mg 1 mg 1 mg 1 mg 1 mg 1 mg   Thu 2 mg 4 mg (3/28); Otherwise 2 mg Hold (4/11) 2 mg 2 mg 2 mg 2 mg   Fri 2 mg 2 mg 2 mg 2 mg 2 mg 2 mg 4 mg (6/7); Otherwise 2 mg   Sat 2 mg 2 mg 2 mg 2 mg 2 mg 2 mg 2 mg   Visit Report   Report           Plan:  1. INR is Subtherapeutic today- see above in Anticoagulation Summary.  Will instruct Ariadne Telles to Change their warfarin regimen (boost today with 4 mg, then continue same)- see above in Anticoagulation Summary.  2. Follow up in 2 weeks  3. Patient declines warfarin refills.  4. Verbal and written information provided. Patient expresses understanding and has no further questions at this time.    Dhiraj Orta, Pharmacy Intern   right lower extremity/left lower extremity

## 2024-11-01 ENCOUNTER — TRANSCRIPTION ENCOUNTER (OUTPATIENT)
Age: 44
End: 2024-11-01

## 2025-02-26 ENCOUNTER — APPOINTMENT (OUTPATIENT)
Dept: OBGYN | Facility: CLINIC | Age: 45
End: 2025-02-26
Payer: COMMERCIAL

## 2025-02-26 ENCOUNTER — ASOB RESULT (OUTPATIENT)
Age: 45
End: 2025-02-26

## 2025-02-26 VITALS
BODY MASS INDEX: 21.16 KG/M2 | DIASTOLIC BLOOD PRESSURE: 64 MMHG | WEIGHT: 127 LBS | HEIGHT: 65 IN | HEART RATE: 67 BPM | SYSTOLIC BLOOD PRESSURE: 105 MMHG

## 2025-02-26 DIAGNOSIS — N60.19 DIFFUSE CYSTIC MASTOPATHY OF UNSPECIFIED BREAST: ICD-10-CM

## 2025-02-26 DIAGNOSIS — N93.9 ABNORMAL UTERINE AND VAGINAL BLEEDING, UNSPECIFIED: ICD-10-CM

## 2025-02-26 DIAGNOSIS — Z01.411 ENCOUNTER FOR GYNECOLOGICAL EXAMINATION (GENERAL) (ROUTINE) WITH ABNORMAL FINDINGS: ICD-10-CM

## 2025-02-26 DIAGNOSIS — Z12.39 ENCOUNTER FOR OTHER SCREENING FOR MALIGNANT NEOPLASM OF BREAST: ICD-10-CM

## 2025-02-26 DIAGNOSIS — N89.8 OTHER SPECIFIED NONINFLAMMATORY DISORDERS OF VAGINA: ICD-10-CM

## 2025-02-26 DIAGNOSIS — N94.9 UNSPECIFIED CONDITION ASSOCIATED WITH FEMALE GENITAL ORGANS AND MENSTRUAL CYCLE: ICD-10-CM

## 2025-02-26 DIAGNOSIS — Z11.51 ENCOUNTER FOR SCREENING FOR HUMAN PAPILLOMAVIRUS (HPV): ICD-10-CM

## 2025-02-26 DIAGNOSIS — Z12.4 ENCOUNTER FOR SCREENING FOR MALIGNANT NEOPLASM OF CERVIX: ICD-10-CM

## 2025-02-26 PROCEDURE — 76830 TRANSVAGINAL US NON-OB: CPT

## 2025-02-26 PROCEDURE — 99396 PREV VISIT EST AGE 40-64: CPT

## 2025-02-26 PROCEDURE — G0444 DEPRESSION SCREEN ANNUAL: CPT | Mod: 59

## 2025-02-26 PROCEDURE — 99459 PELVIC EXAMINATION: CPT | Mod: NC

## 2025-02-26 PROCEDURE — 99214 OFFICE O/P EST MOD 30 MIN: CPT | Mod: 25

## 2025-02-26 RX ORDER — LEVETIRACETAM 500 MG/1
500 TABLET, FILM COATED ORAL
Refills: 0 | Status: ACTIVE | COMMUNITY

## 2025-03-03 ENCOUNTER — NON-APPOINTMENT (OUTPATIENT)
Age: 45
End: 2025-03-03

## 2025-03-03 PROBLEM — N94.9 ADNEXAL FULLNESS: Status: ACTIVE | Noted: 2025-03-03

## 2025-03-03 PROBLEM — Z01.411 ENCOUNTER FOR WELL WOMAN EXAM WITH ABNORMAL FINDINGS: Status: ACTIVE | Noted: 2025-03-03

## 2025-03-03 LAB
A VAGINAE DNA VAG QL NAA+PROBE: NORMAL
ANTI-MUELLERIAN HORMONE: 0.35 NG/ML
BVAB2 DNA VAG QL NAA+PROBE: NORMAL
C KRUSEI DNA VAG QL NAA+PROBE: NEGATIVE
C TRACH RRNA SPEC QL NAA+PROBE: NEGATIVE
CANDIDA DNA VAG QL NAA+PROBE: NEGATIVE
ESTIMATED AVERAGE GLUCOSE: 103 MG/DL
GLUCOSE BS SERPL-MCNC: 96 MG/DL
HBA1C MFR BLD HPLC: 5.2 %
HPV HIGH+LOW RISK DNA PNL CVX: NOT DETECTED
MEGA1 DNA VAG QL NAA+PROBE: NORMAL
N GONORRHOEA RRNA SPEC QL NAA+PROBE: NEGATIVE
PROLACTIN SERPL-MCNC: 10.7 NG/ML
T VAGINALIS RRNA SPEC QL NAA+PROBE: NEGATIVE
T3FREE SERPL-MCNC: 2.92 PG/ML
T4 FREE SERPL-MCNC: 1.4 NG/DL
THYROGLOB AB SERPL-ACNC: 15.1 IU/ML
THYROPEROXIDASE AB SERPL IA-ACNC: 11.4 IU/ML
TSH RECEPTOR AB: <1.1 IU/L
TSH SERPL-ACNC: 1.76 UIU/ML

## 2025-03-04 LAB
17OHP SERPL-MCNC: 28 NG/DL
ALBUMIN SERPL ELPH-MCNC: 4.5 G/DL
ALP BLD-CCNC: 37 U/L
ALT SERPL-CCNC: 11 U/L
ANION GAP SERPL CALC-SCNC: 15 MMOL/L
AST SERPL-CCNC: 15 U/L
BILIRUB SERPL-MCNC: 0.5 MG/DL
BUN SERPL-MCNC: 17 MG/DL
CALCIUM SERPL-MCNC: 9.4 MG/DL
CHLORIDE SERPL-SCNC: 103 MMOL/L
CO2 SERPL-SCNC: 23 MMOL/L
CREAT SERPL-MCNC: 0.74 MG/DL
CYTOLOGY CVX/VAG DOC THIN PREP: NORMAL
EGFR: 104 ML/MIN/1.73M2
ESTRADIOL SERPL-MCNC: 92 PG/ML
FSH SERPL-MCNC: 11.9 IU/L
GLUCOSE SERPL-MCNC: 103 MG/DL
LH SERPL-ACNC: 5.9 IU/L
POTASSIUM SERPL-SCNC: 4.5 MMOL/L
PROGEST SERPL-MCNC: 0.3 NG/ML
PROT SERPL-MCNC: 6.9 G/DL
SODIUM SERPL-SCNC: 141 MMOL/L
TESTOST SERPL-MCNC: 18.7 NG/DL

## 2025-03-06 ENCOUNTER — RESULT REVIEW (OUTPATIENT)
Age: 45
End: 2025-03-06

## 2025-03-06 ENCOUNTER — APPOINTMENT (OUTPATIENT)
Dept: MAMMOGRAPHY | Facility: CLINIC | Age: 45
End: 2025-03-06
Payer: COMMERCIAL

## 2025-03-06 ENCOUNTER — APPOINTMENT (OUTPATIENT)
Dept: ULTRASOUND IMAGING | Facility: CLINIC | Age: 45
End: 2025-03-06
Payer: COMMERCIAL

## 2025-03-06 PROCEDURE — 77063 BREAST TOMOSYNTHESIS BI: CPT

## 2025-03-06 PROCEDURE — 77067 SCR MAMMO BI INCL CAD: CPT

## 2025-03-06 PROCEDURE — 76641 ULTRASOUND BREAST COMPLETE: CPT | Mod: 50

## 2025-03-08 LAB — DHEA-SULFATE, SERUM: 95 UG/DL

## 2025-03-09 LAB
INSULIN FREE SERPL-MCNC: 3.8 UU/ML
INSULIN: 3.8 UU/ML

## 2025-04-03 ENCOUNTER — EMERGENCY (EMERGENCY)
Facility: HOSPITAL | Age: 45
LOS: 1 days | Discharge: HOME | End: 2025-04-03
Attending: EMERGENCY MEDICINE
Payer: SELF-PAY

## 2025-04-03 VITALS
OXYGEN SATURATION: 99 % | HEIGHT: 65 IN | SYSTOLIC BLOOD PRESSURE: 110 MMHG | WEIGHT: 125 LBS | RESPIRATION RATE: 18 BRPM | DIASTOLIC BLOOD PRESSURE: 75 MMHG | HEART RATE: 78 BPM | TEMPERATURE: 98 F

## 2025-04-03 VITALS
DIASTOLIC BLOOD PRESSURE: 78 MMHG | HEART RATE: 71 BPM | SYSTOLIC BLOOD PRESSURE: 114 MMHG | TEMPERATURE: 98 F | OXYGEN SATURATION: 99 % | RESPIRATION RATE: 18 BRPM

## 2025-04-03 PROCEDURE — 99284 EMERGENCY DEPT VISIT MOD MDM: CPT | Mod: 25

## 2025-04-03 PROCEDURE — 99284 EMERGENCY DEPT VISIT MOD MDM: CPT

## 2025-04-03 PROCEDURE — 70450 CT HEAD/BRAIN W/O DYE: CPT | Mod: 26

## 2025-04-03 PROCEDURE — 70450 CT HEAD/BRAIN W/O DYE: CPT | Mod: MC

## 2025-04-03 RX ORDER — ONDANSETRON HCL/PF 4 MG/2 ML
4 VIAL (ML) INJECTION ONCE
Refills: 0 | Status: COMPLETED | OUTPATIENT
Start: 2025-04-03 | End: 2025-04-03

## 2025-04-03 RX ORDER — ACETAMINOPHEN 500 MG/5ML
975 LIQUID (ML) ORAL ONCE
Refills: 0 | Status: COMPLETED | OUTPATIENT
Start: 2025-04-03 | End: 2025-04-03

## 2025-04-03 RX ADMIN — Medication 4 MILLIGRAM(S): at 15:46

## 2025-04-03 RX ADMIN — Medication 975 MILLIGRAM(S): at 15:46
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
## 2025-04-03 NOTE — ED PROVIDER NOTE - PROGRESS NOTE DETAILS
Pt tolerating PO, has improvement in pain and nausea, return precautions given and all questions answered. -CAMILA WillsC
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
## 2025-04-03 NOTE — ED ADULT NURSE NOTE - NSFALLUNIVINTERV_ED_ALL_ED
Bed/Stretcher in lowest position, wheels locked, appropriate side rails in place/Call bell, personal items and telephone in reach/Instruct patient to call for assistance before getting out of bed/chair/stretcher/Non-slip footwear applied when patient is off stretcher/[place] to call system/Physically safe environment - no spills, clutter or unnecessary equipment/Purposeful proactive rounding/Room/bathroom lighting operational, light cord in reach

## 2025-04-03 NOTE — ED PROVIDER NOTE - NSFOLLOWUPINSTRUCTIONS_ED_ALL_ED_FT
You were evaluated in the Emergency Department after a head injury    You had a CT scan which showed__________________    Take tylenol and motrin as needed for pain    Follow up with your Primary Care Provider in the next 2-3 days    Follow up with our Concussion clinic in the next 1-2 weeks if you have any continued symptoms by calling   [phone]    Return to the Emergency Department if you have any worsening pain, vomiting, changes in vision, numbness, weakness and all other concerns. Read below for additional information on concussions  Concussion, Adult  Three rear views of the head showing how quick, sudden head movements injure the brain.  A concussion is a brain injury from a hard, direct hit (trauma) to your head or body. This hit causes your brain to quickly shake back and forth inside your skull. A concussion may also be called a mild traumatic brain injury (TBI). Healing from this injury can take time.    The effects of a concussion can be serious. If you have a concussion, you should be very careful to avoid having a second concussion.    What are the causes?  This condition is caused by:  A direct hit to your head.  A quick and sudden movement of the head or neck, such as in a car crash.  What are the signs or symptoms?  The signs of a concussion can be hard to notice. They may be missed by you, family members, and doctors. You may look fine on the outside but may not act or feel normal.    Physical symptoms    Headaches or feeling dizzy.  Problems with body balance.  Being sensitive to light or noise.  Vomiting or feeling like you may vomit.  Being tired.  Problems seeing or hearing.  Seizure.  Mental and emotional symptoms    Feeling grouchy (irritable) or having mood changes.  Problems remembering things.  Trouble focusing your mind (concentrating), organizing, or making decisions.  Not sleeping or eating as you used to.  Being slow to think, act, react, speak, or read.  Feeling worried or nervous (anxious).  Feeling sad (depressed).  How is this treated?  This condition may be treated by:  Stopping sports or activity if you are injured.  Resting your body and your mind.  Being watched carefully, often at home.  Medicines to help with symptoms such as:  Headaches.  Feeling like you may vomit.  Problems with sleep.  You may need to go to a concussion clinic or a place to help you recover (rehab).    Follow these instructions at home:  Activity    Limit activities that need a lot of thought or focus, such as:  Homework or work for your job.  Watching TV.  Using the computer or phone.  Playing memory games and puzzles.  Get rest because this helps your brain heal. Make sure you:  Get plenty of sleep. Most adults should get 7–9 hours of sleep each night.  Rest during the day. Take naps or breaks when you feel tired.  Avoid activity or exercise that takes a lot of effort until your doctor says it is safe.  Stop any activity that makes symptoms worse.  Your doctor may tell you to do light exercise like walking.  Do not do activities that could cause a second concussion, such as riding a bike or playing sports.  Ask your doctor when you can return to your normal activities, such as school, work, sports, and driving.  Your ability to react may be slower.  Do not do these activities if you are dizzy.  General instructions    A bottle of beer, a glass of wine, and a glass of hard liquor with a "do not drink" sign over them.  Take over-the-counter and prescription medicines only as told by your doctor.  Avoid taking strong pain medicines (opioids) after a concussion.  Do not drink alcohol until your doctor says you can.  Watch your symptoms and tell other people to do the same. Other problems can occur after a concussion.  Tell your , teachers, school nurse, school counselor, , or  about your injury and symptoms. Tell them about what you can or cannot do.  See a mental health therapist if you keep feeling worried and nervous or sad.  Keep all follow-up visits. Your doctor will check on your recovery and give you a plan for returning to activities.  How is this prevented?  It is very important that you do not get another brain injury. In rare cases, another injury can cause brain damage that will not go away, brain swelling, or death. The risk of this is greatest in the first 7–10 days after a head injury. To avoid injuries:  Stop activities that could lead to a second concussion, such as contact sports, until your doctor says it is okay.  When you return to sports or activities:  Do not crash into other players. This is how most concussions happen.  Follow the rules.  Respect other players. Do not engage in violent behavior while playing.  Get regular exercise. Do strength and balance training.  Wear a helmet that fits you well during sports, biking, or other activities.  Helmets can help protect you from serious skull and brain injuries, but they may not protect you from a concussion. Even when wearing a helmet, you should avoid being hit in the head.  Where to find more information  Centers for Disease Control and Prevention: cdc.gov  Contact a doctor if:  Your symptoms do not get better or get worse.  You have new symptoms.  You have another injury.  Your balance gets worse.  You have changes in how you act.  Get help right away if:  You have very bad headaches or your headaches get worse.  You have any of these problems:  Feeling weak or numb in any part of your body.  Slurred speech.  Changes in how you see (vision).  Feeling mixed up (confused).  You vomit often.  You faint or other people have trouble waking you up.  You have a seizure.  These symptoms may be an emergency. Get help right away. Call 911.  Do not wait to see if the symptoms will go away.  Do not drive yourself to the hospital.  Also, get help right away if:  You have thoughts of hurting yourself or others.  Take one of these steps if you feel like you may hurt yourself or others, or have thoughts about taking your own life:  Go to your nearest emergency room.  Call 911.  Call the National Suicide Prevention Lifeline at 1-122.152.8792 or 741. This is open 24 hours a day.  Text the Crisis Text Line at 613161. You were evaluated in the Emergency Department after a head injury    You had a CT scan which showed no acute hemorrhage or fractures    Take tylenol and motrin as needed for pain    Follow up with your Primary Care Provider in the next 2-3 days    Follow up with our Concussion clinic in the next 1-2 weeks if you have any continued symptoms by calling   [phone]    Return to the Emergency Department if you have any worsening pain, vomiting, changes in vision, numbness, weakness and all other concerns. Read below for additional information on concussions  Concussion, Adult  Three rear views of the head showing how quick, sudden head movements injure the brain.  A concussion is a brain injury from a hard, direct hit (trauma) to your head or body. This hit causes your brain to quickly shake back and forth inside your skull. A concussion may also be called a mild traumatic brain injury (TBI). Healing from this injury can take time.    The effects of a concussion can be serious. If you have a concussion, you should be very careful to avoid having a second concussion.    What are the causes?  This condition is caused by:  A direct hit to your head.  A quick and sudden movement of the head or neck, such as in a car crash.  What are the signs or symptoms?  The signs of a concussion can be hard to notice. They may be missed by you, family members, and doctors. You may look fine on the outside but may not act or feel normal.    Physical symptoms    Headaches or feeling dizzy.  Problems with body balance.  Being sensitive to light or noise.  Vomiting or feeling like you may vomit.  Being tired.  Problems seeing or hearing.  Seizure.  Mental and emotional symptoms    Feeling grouchy (irritable) or having mood changes.  Problems remembering things.  Trouble focusing your mind (concentrating), organizing, or making decisions.  Not sleeping or eating as you used to.  Being slow to think, act, react, speak, or read.  Feeling worried or nervous (anxious).  Feeling sad (depressed).  How is this treated?  This condition may be treated by:  Stopping sports or activity if you are injured.  Resting your body and your mind.  Being watched carefully, often at home.  Medicines to help with symptoms such as:  Headaches.  Feeling like you may vomit.  Problems with sleep.  You may need to go to a concussion clinic or a place to help you recover (rehab).    Follow these instructions at home:  Activity    Limit activities that need a lot of thought or focus, such as:  Homework or work for your job.  Watching TV.  Using the computer or phone.  Playing memory games and puzzles.  Get rest because this helps your brain heal. Make sure you:  Get plenty of sleep. Most adults should get 7–9 hours of sleep each night.  Rest during the day. Take naps or breaks when you feel tired.  Avoid activity or exercise that takes a lot of effort until your doctor says it is safe.  Stop any activity that makes symptoms worse.  Your doctor may tell you to do light exercise like walking.  Do not do activities that could cause a second concussion, such as riding a bike or playing sports.  Ask your doctor when you can return to your normal activities, such as school, work, sports, and driving.  Your ability to react may be slower.  Do not do these activities if you are dizzy.  General instructions    A bottle of beer, a glass of wine, and a glass of hard liquor with a "do not drink" sign over them.  Take over-the-counter and prescription medicines only as told by your doctor.  Avoid taking strong pain medicines (opioids) after a concussion.  Do not drink alcohol until your doctor says you can.  Watch your symptoms and tell other people to do the same. Other problems can occur after a concussion.  Tell your , teachers, school nurse, school counselor, , or  about your injury and symptoms. Tell them about what you can or cannot do.  See a mental health therapist if you keep feeling worried and nervous or sad.  Keep all follow-up visits. Your doctor will check on your recovery and give you a plan for returning to activities.  How is this prevented?  It is very important that you do not get another brain injury. In rare cases, another injury can cause brain damage that will not go away, brain swelling, or death. The risk of this is greatest in the first 7–10 days after a head injury. To avoid injuries:  Stop activities that could lead to a second concussion, such as contact sports, until your doctor says it is okay.  When you return to sports or activities:  Do not crash into other players. This is how most concussions happen.  Follow the rules.  Respect other players. Do not engage in violent behavior while playing.  Get regular exercise. Do strength and balance training.  Wear a helmet that fits you well during sports, biking, or other activities.  Helmets can help protect you from serious skull and brain injuries, but they may not protect you from a concussion. Even when wearing a helmet, you should avoid being hit in the head.  Where to find more information  Centers for Disease Control and Prevention: cdc.gov  Contact a doctor if:  Your symptoms do not get better or get worse.  You have new symptoms.  You have another injury.  Your balance gets worse.  You have changes in how you act.  Get help right away if:  You have very bad headaches or your headaches get worse.  You have any of these problems:  Feeling weak or numb in any part of your body.  Slurred speech.  Changes in how you see (vision).  Feeling mixed up (confused).  You vomit often.  You faint or other people have trouble waking you up.  You have a seizure.  These symptoms may be an emergency. Get help right away. Call 911.  Do not wait to see if the symptoms will go away.  Do not drive yourself to the hospital.  Also, get help right away if:  You have thoughts of hurting yourself or others.  Take one of these steps if you feel like you may hurt yourself or others, or have thoughts about taking your own life:  Go to your nearest emergency room.  Call 911.  Call the National Suicide Prevention Lifeline at 1-463.245.6423 or 530. This is open 24 hours a day.  Text the Crisis Text Line at 211941.

## 2025-04-03 NOTE — ED PROVIDER NOTE - PATIENT PORTAL LINK FT
You can access the FollowMyHealth Patient Portal offered by [facility] by registering at the following website: [email or web address] By joining [email or web address] FollowMyHealth portal, you will also be able to view your health information using other applications (apps) compatible with our system.

## 2025-04-03 NOTE — ED PROVIDER NOTE - ED STEMI HIDDEN
Patient's first and last name, , procedure, and correct site confirmed prior to the start of procedure. hide

## 2025-04-03 NOTE — ED PROVIDER NOTE - PHYSICAL EXAMINATION
A&Ox3, NAD, well appearing  NCAT. PERRL, EOMI.  Neck supple, No vertebral ttp of the c/t/l spine, c-spine with FAROM  Lungs CTAB. No w/r/r  Cardiac  RRR, no chest wall ttp or overlaying ecchymosis/abrasions/lacerations  Abd soft, NT/ND, no rebound or guarding.   Extremities: cap refill <2, pulses in distal extremities 4+, no edema.   Skin without ecchymosis, abrasions, lacerations  No focal Deficits, Strength 5/5 UE/LE. Gait steady. Pelvis stable.

## 2025-04-03 NOTE — ED ADULT TRIAGE NOTE - GLASGOW COMA SCALE: BEST VERBAL RESPONSE, MLM
(V5) oriented PMI and heart sounds localize heart on left side of chest; murmurs absent; pulse with normal variation, frequency and intensity (amplitude or strength) with equal intensity on upper and lower extremities; blood pressure value(s) are adequate.

## 2025-04-09 ENCOUNTER — NON-APPOINTMENT (OUTPATIENT)
Age: 45
End: 2025-04-09

## 2025-04-09 DIAGNOSIS — Z91.89 OTHER SPECIFIED PERSONAL RISK FACTORS, NOT ELSEWHERE CLASSIFIED: ICD-10-CM
